# Patient Record
Sex: MALE | Race: BLACK OR AFRICAN AMERICAN | HISPANIC OR LATINO | Employment: FULL TIME | URBAN - METROPOLITAN AREA
[De-identification: names, ages, dates, MRNs, and addresses within clinical notes are randomized per-mention and may not be internally consistent; named-entity substitution may affect disease eponyms.]

---

## 2020-12-24 ENCOUNTER — HOSPITAL ENCOUNTER (EMERGENCY)
Facility: HOSPITAL | Age: 30
Discharge: HOME | End: 2020-12-24
Attending: EMERGENCY MEDICINE | Admitting: EMERGENCY MEDICINE

## 2020-12-24 VITALS
OXYGEN SATURATION: 98 % | WEIGHT: 270 LBS | HEART RATE: 102 BPM | SYSTOLIC BLOOD PRESSURE: 132 MMHG | TEMPERATURE: 98.1 F | RESPIRATION RATE: 18 BRPM | HEIGHT: 68 IN | BODY MASS INDEX: 40.92 KG/M2 | DIASTOLIC BLOOD PRESSURE: 72 MMHG

## 2020-12-24 DIAGNOSIS — M79.605 LEFT LEG PAIN: Primary | ICD-10-CM

## 2020-12-24 DIAGNOSIS — S76.119A: ICD-10-CM

## 2020-12-24 PROCEDURE — 99283 EMERGENCY DEPT VISIT LOW MDM: CPT

## 2020-12-24 PROCEDURE — 99284 EMERGENCY DEPT VISIT MOD MDM: CPT | Performed by: EMERGENCY MEDICINE

## 2020-12-24 RX ORDER — IBUPROFEN 600 MG/1
600 TABLET ORAL ONCE
Status: COMPLETED | OUTPATIENT
Start: 2020-12-24 | End: 2020-12-24

## 2020-12-24 RX ORDER — IBUPROFEN 600 MG/1
600 TABLET ORAL EVERY 6 HOURS PRN
Qty: 30 TABLET | Refills: 0 | Status: SHIPPED | OUTPATIENT
Start: 2020-12-24

## 2020-12-24 RX ADMIN — IBUPROFEN 600 MG: 600 TABLET, FILM COATED ORAL at 03:45

## 2021-01-12 ENCOUNTER — NURSE TRIAGE (OUTPATIENT)
Dept: OTHER | Facility: OTHER | Age: 31
End: 2021-01-12

## 2021-01-12 NOTE — TELEPHONE ENCOUNTER
Regarding: COVID Test Request - loss of taste and smell  ----- Message from Lily Million sent at 1/12/2021  8:21 AM EST -----  "I can't smell  I can't taste " Pt reported that this started yesterday  "I picked up my son from the  on Thursday and she was coughing   On Sunday the  notified me that she couldn't watch my son, because she's under quarentine "

## 2021-01-12 NOTE — TELEPHONE ENCOUNTER
Called and spoke to patient  Patient stated that he called his health insurance and that he was told that they were not covering the cost of the test  Patient stated that he would be covered if he went to an Urgent Care and already spoke with someone at [facility] and that he was on his way there now  Patient does not have a PCP and is not interested at this time in establishing care

## 2022-12-14 ENCOUNTER — HOSPITAL ENCOUNTER (EMERGENCY)
Facility: HOSPITAL | Age: 32
Discharge: HOME | End: 2022-12-14
Attending: EMERGENCY MEDICINE

## 2022-12-14 ENCOUNTER — HOSPITAL ENCOUNTER (EMERGENCY)
Facility: HOSPITAL | Age: 32
End: 2022-12-14
Attending: EMERGENCY MEDICINE

## 2022-12-14 ENCOUNTER — APPOINTMENT (EMERGENCY)
Dept: CT IMAGING | Facility: HOSPITAL | Age: 32
End: 2022-12-14

## 2022-12-14 VITALS
OXYGEN SATURATION: 99 % | RESPIRATION RATE: 18 BRPM | DIASTOLIC BLOOD PRESSURE: 105 MMHG | SYSTOLIC BLOOD PRESSURE: 177 MMHG | TEMPERATURE: 98 F | HEART RATE: 95 BPM

## 2022-12-14 VITALS
RESPIRATION RATE: 18 BRPM | DIASTOLIC BLOOD PRESSURE: 110 MMHG | SYSTOLIC BLOOD PRESSURE: 174 MMHG | OXYGEN SATURATION: 99 % | HEART RATE: 89 BPM | TEMPERATURE: 97.3 F

## 2022-12-14 DIAGNOSIS — J36 PERITONSILLAR ABSCESS: Primary | ICD-10-CM

## 2022-12-14 DIAGNOSIS — J36 PERITONSILLITIS: Primary | ICD-10-CM

## 2022-12-14 LAB
ALBUMIN SERPL BCP-MCNC: 4.5 G/DL (ref 3.5–5)
ALP SERPL-CCNC: 83 U/L (ref 34–104)
ALT SERPL W P-5'-P-CCNC: 19 U/L (ref 7–52)
ANION GAP SERPL CALCULATED.3IONS-SCNC: 12 MMOL/L (ref 4–13)
AST SERPL W P-5'-P-CCNC: 15 U/L (ref 13–39)
BASOPHILS # BLD AUTO: 0.04 THOUSANDS/ÂΜL (ref 0–0.1)
BASOPHILS NFR BLD AUTO: 0 % (ref 0–1)
BILIRUB SERPL-MCNC: 1.18 MG/DL (ref 0.2–1)
BUN SERPL-MCNC: 10 MG/DL (ref 5–25)
CALCIUM SERPL-MCNC: 9.8 MG/DL (ref 8.4–10.2)
CHLORIDE SERPL-SCNC: 100 MMOL/L (ref 96–108)
CO2 SERPL-SCNC: 26 MMOL/L (ref 21–32)
CREAT SERPL-MCNC: 0.85 MG/DL (ref 0.6–1.3)
EOSINOPHIL # BLD AUTO: 0.18 THOUSAND/ÂΜL (ref 0–0.61)
EOSINOPHIL NFR BLD AUTO: 1 % (ref 0–6)
ERYTHROCYTE [DISTWIDTH] IN BLOOD BY AUTOMATED COUNT: 12.4 % (ref 11.6–15.1)
GFR SERPL CREATININE-BSD FRML MDRD: 114 ML/MIN/1.73SQ M
GLUCOSE SERPL-MCNC: 104 MG/DL (ref 65–140)
HCT VFR BLD AUTO: 42.6 % (ref 36.5–49.3)
HETEROPH AB SER QL: NEGATIVE
HGB BLD-MCNC: 14.6 G/DL (ref 12–17)
IMM GRANULOCYTES # BLD AUTO: 0.04 THOUSAND/UL (ref 0–0.2)
IMM GRANULOCYTES NFR BLD AUTO: 0 % (ref 0–2)
LYMPHOCYTES # BLD AUTO: 2.55 THOUSANDS/ÂΜL (ref 0.6–4.47)
LYMPHOCYTES NFR BLD AUTO: 19 % (ref 14–44)
MCH RBC QN AUTO: 28.2 PG (ref 26.8–34.3)
MCHC RBC AUTO-ENTMCNC: 34.3 G/DL (ref 31.4–37.4)
MCV RBC AUTO: 82 FL (ref 82–98)
MONOCYTES # BLD AUTO: 1.39 THOUSAND/ÂΜL (ref 0.17–1.22)
MONOCYTES NFR BLD AUTO: 10 % (ref 4–12)
NEUTROPHILS # BLD AUTO: 9.29 THOUSANDS/ÂΜL (ref 1.85–7.62)
NEUTS SEG NFR BLD AUTO: 70 % (ref 43–75)
NRBC BLD AUTO-RTO: 0 /100 WBCS
PLATELET # BLD AUTO: 285 THOUSANDS/UL (ref 149–390)
PMV BLD AUTO: 8.9 FL (ref 8.9–12.7)
POTASSIUM SERPL-SCNC: 3.4 MMOL/L (ref 3.5–5.3)
PROT SERPL-MCNC: 8.6 G/DL (ref 6.4–8.4)
RBC # BLD AUTO: 5.18 MILLION/UL (ref 3.88–5.62)
S PYO DNA THROAT QL NAA+PROBE: DETECTED
SODIUM SERPL-SCNC: 138 MMOL/L (ref 135–147)
WBC # BLD AUTO: 13.49 THOUSAND/UL (ref 4.31–10.16)

## 2022-12-14 RX ORDER — LIDOCAINE HYDROCHLORIDE AND EPINEPHRINE BITARTRATE 20; .01 MG/ML; MG/ML
1 INJECTION, SOLUTION SUBCUTANEOUS ONCE
Status: DISCONTINUED | OUTPATIENT
Start: 2022-12-14 | End: 2022-12-14 | Stop reason: HOSPADM

## 2022-12-14 RX ORDER — CLINDAMYCIN HYDROCHLORIDE 300 MG/1
300 CAPSULE ORAL 4 TIMES DAILY
Qty: 40 CAPSULE | Refills: 0 | Status: SHIPPED | OUTPATIENT
Start: 2022-12-14 | End: 2022-12-24

## 2022-12-14 RX ORDER — DEXAMETHASONE SODIUM PHOSPHATE 10 MG/ML
10 INJECTION, SOLUTION INTRAMUSCULAR; INTRAVENOUS ONCE
Status: COMPLETED | OUTPATIENT
Start: 2022-12-14 | End: 2022-12-14

## 2022-12-14 RX ORDER — LIDOCAINE HYDROCHLORIDE AND EPINEPHRINE 20; 5 MG/ML; UG/ML
1 INJECTION, SOLUTION EPIDURAL; INFILTRATION; INTRACAUDAL; PERINEURAL ONCE
Status: DISCONTINUED | OUTPATIENT
Start: 2022-12-14 | End: 2022-12-14 | Stop reason: HOSPADM

## 2022-12-14 RX ADMIN — IOHEXOL 85 ML: 350 INJECTION, SOLUTION INTRAVENOUS at 11:19

## 2022-12-14 RX ADMIN — TOPICAL ANESTHETIC 2 SPRAY: 200 SPRAY DENTAL; PERIODONTAL at 17:30

## 2022-12-14 RX ADMIN — DEXAMETHASONE SODIUM PHOSPHATE 10 MG: 10 INJECTION, SOLUTION INTRAMUSCULAR; INTRAVENOUS at 13:07

## 2022-12-14 NOTE — CONSULTS
OTOLARYNGOLOGY CONSULT    Date of Service: 12/14/2022    Reason for consult: Peritonsillar Abscess     ASSESSMENT/PLAN:  Anthony Ovalles is a 34 y o  male who we admitted directly to the ED for drainage of PTA  No fluid could be aspirated  D/c home with clindamycin q6 hours for 10 days  Follow up with Dr Floyd Finn in 1 week outpatient  HPI  This patient is a 35 yo male who presented with multiple days of throat discomfort and swelling  The patient was found to have L PTA on CT 2 1cm  The patient was transferred to [facility] for aspiration, possible I&D and medical treatment  Denies nausea, foul taste in mouth, and prior tonsil infections       [facility] MEDICATIONS  Current Facility-Administered Medications   Medication Dose Route Frequency Provider Last Rate Last Admin   • benzocaine (HURRICAINE) 20 % mucosal spray 2 spray  2 spray Mucosal Once Mauricio Lepe MD       • lidocaine-epinephrine (XYLOCAINE-MPF/EPINEPHRINE) 2 %-1:200,000 injection 1 mL  1 mL Infiltration Once Misa Evans MD       • lidocaine-epinephrine (XYLOCAINE/EPINEPHRINE) 2 %-1:100,000 injection 1 mL  1 mL Infiltration Once Misa Evans MD         Current Outpatient Medications   Medication Sig Dispense Refill   • clindamycin (CLEOCIN) 300 MG capsule Take 1 capsule (300 mg total) by mouth 4 (four) times a day for 10 days 40 capsule 0   • ibuprofen (MOTRIN) 600 mg tablet Take 1 tablet (600 mg total) by mouth every 6 (six) hours as needed for mild pain 30 tablet 0       REVIEW OF SYSTEMS  As above    HISTORIES  PMH:  Past Medical History:   Diagnosis Date   • Patient denies medical problems        PSH:  Past Surgical History:   Procedure Laterality Date   • NO PAST SURGERIES     • NOSE SURGERY         SocHx:  Social History     Tobacco Use   • Smoking status: Some Days     Types: Cigarettes   • Smokeless tobacco: Never   • Tobacco comments:     per Naz   Vaping Use   • Vaping Use: Never used   Substance Use Topics   • Alcohol use: Yes     Comment: occasional   • Drug use: Yes     Types: Marijuana, Other, Amphetamines       FH:  Family History   Problem Relation Age of Onset   • Diabetes Maternal Aunt        ALLERGIES:  No Known Allergies    PHYSICAL EXAM  Visit Vitals  BP (!) 177/105 (BP Location: Left arm)   Pulse 95   Temp 98 °F (36 7 °C)   Resp 18   SpO2 99%   Smoking Status Some Days       General: NAD, AOx4  Eyes:  EOMI, PERRL  Ears:External ears normal in appearance   Nose:  External appearance normal, no septal deviation   Oral cavity:  L PTA visualized with uvula deviated to the R, mild trismus 2/2 pain  Neck: Trachea is midline; no thyroid nodules, Salivary glands symmetrical, no masses/abnormality on palpation  Lymph:  Mild LAD  Skin:  No obvious facial lesions  Neuro: Motor and sensory grossly intact  Face symmetrical, no obvious cranial nerve palsies,motor and sensory grossly intact, no focal deficits  Lungs:  Normal work of breathing, symmetrical chest expansion  Vascular: Well perfused      LABORATORY  Reviewed    PROCEDURES   PTA Aspiration:     The patient was placed in the sitting position  1 hurricane spray was applied to the kenny-tonsillar area  The area was injected with 2cc of Xilocaine using a 22 gauge needle submucosally  The area between uvula and anterior pillar was aspirated without relief in 2 places  The patient tolerated the procedure well without complications       RADIOLOGY      Patient Active Problem List    Diagnosis Date Noted   • Peritonsillitis 12/14/2022         Mynor Mathews MD  Otolaryngology--Head and Neck Surgery  [facility]  12/14/2022 6:30 PM

## 2022-12-14 NOTE — Clinical Note
Myrtle PutnamLilliana was seen and treated in our emergency department on 12/14/2022  Diagnosis:     Johnny    He may return on this date: 12/15/2022         If you have any questions or concerns, please don't hesitate to call        Candace Christie, DO    ______________________________           _______________          _______________  Hospital Representative                              Date                                Time

## 2022-12-14 NOTE — EMTALA/ACUTE CARE TRANSFER
[facility] EMERGENCY DEPARTMENT  [address] [facility] [address]  Dept: [phone]      EMTALA TRANSFER CONSENT    NAME Nemesio STEVE 1990                              MRN [id number]    I have been informed of my rights regarding examination, treatment, and transfer   by Dr Ilir Chávez: Specialized equipment and/or services available at the receiving facility (Include comment)________________________    Risks: Potential for delay in receiving treatment, Potential deterioration of medical condition      Consent for Transfer:  I acknowledge that my medical condition has been evaluated and explained to me by the emergency department physician or other qualified medical person and/or my attending physician, who has recommended that I be transferred to the service of  Accepting Physician: Dr Marimar Duke at    The above potential benefits of such transfer, the potential risks associated with such transfer, and the probable risks of not being transferred have been explained to me, and I fully understand them  The doctor has explained that, in my case, the benefits of transfer outweigh the risks  I agree to be transferred  I authorize the performance of emergency medical procedures and treatments upon me in both transit and upon arrival at the receiving facility  Additionally, I authorize the release of any and all medical records to the receiving facility and request they be transported with me, if possible  I understand that the safest mode of transportation during a medical emergency is an ambulance and that the Hospital advocates the use of this mode of transport  Risks of traveling to the receiving facility by car, including absence of medical control, life sustaining equipment, such as oxygen, and medical personnel has been explained to me and I fully understand them      (PURA CORRECT BOX BELOW)  [  ]  I consent to the stated transfer and to be transported by ambulance/helicopter  [  ]  I consent to the stated transfer, but refuse transportation by ambulance and accept full responsibility for my transportation by car  I understand the risks of non-ambulance transfers and I exonerate the Hospital and its staff from any deterioration in my condition that results from this refusal     X___________________________________________    DATE  22  TIME________  Signature of patient or legally responsible individual signing on patient behalf           RELATIONSHIP TO PATIENT_________________________          Provider Certification    NAME Lizbeth Reed                                         1990                              MRN [id number]    A medical screening exam was performed on the above named patient  Based on the examination:    Condition Necessitating Transfer The encounter diagnosis was Peritonsillar abscess  Patient Condition: The patient has been stabilized such that within reasonable medical probability, no material deterioration of the patient condition or the condition of the unborn child(jayne) is likely to result from the transfer    Reason for Transfer: Level of Care needed not available at this facility    Transfer Requirements: Facility     · Space available and qualified personnel available for treatment as acknowledged by    · Agreed to accept transfer and to provide appropriate medical treatment as acknowledged by       Dr Ronny Garcia  · Appropriate medical records of the examination and treatment of the patient are provided at the time of transfer   [address], [address] _______  · Transfer will be performed by qualified personnel from    and appropriate transfer equipment as required, including the use of necessary and appropriate life support measures      Provider Certification: I have examined the patient and explained the following risks and benefits of being transferred/refusing transfer to the patient/family:  General risk, such as traffic hazards, adverse weather conditions, rough terrain or turbulence, possible failure of equipment (including vehicle or aircraft), or consequences of actions of persons outside the control of the transport personnel, Unanticipated needs of medical equipment and personnel during transport      Based on these reasonable risks and benefits to the patient and/or the unborn child(jayne), and based upon the information available at the time of the patient’s examination, I certify that the medical benefits reasonably to be expected from the provision of appropriate medical treatments at another medical facility outweigh the increasing risks, if any, to the individual’s medical condition, and in the case of labor to the unborn child, from effecting the transfer      X____________________________________________ DATE 12/14/22        TIME_______      ORIGINAL - SEND TO MEDICAL RECORDS   COPY - SEND WITH PATIENT DURING TRANSFER

## 2022-12-14 NOTE — ED PROVIDER NOTES
History  Chief Complaint   Patient presents with   • Sore Throat     Onset of sore throat for 3 days     19-year-old male was sent in from an urgent care with 3 days of significant throat pain that is worse when he swallows, he has had decreased appetite due to pain with swallowing, reports he feels some swelling in the back of his throat, the patient denies any fever, headache, dizziness, chest pain, cough, shortness of breath, nausea, vomiting, abdominal pain, no GI or urinary complaints  The patient had a strep swab at the urgent care which he says he feels was poorly done and was strep negative  The patient denies any recent trauma, reports he was previously diagnosed with hypertension but never took any medications, no allergies, no surgeries, the patient drinks almost every day and drinks a lot of alcohol, smokes marijuana daily with tobacco rolled into his blunts, denies any other drugs  Prior to Admission Medications   Prescriptions Last Dose Informant Patient Reported? Taking?   ibuprofen (MOTRIN) 600 mg tablet   No No   Sig: Take 1 tablet (600 mg total) by mouth every 6 (six) hours as needed for mild pain      Facility-Administered Medications: None       Past Medical History:   Diagnosis Date   • Patient denies medical problems        Past Surgical History:   Procedure Laterality Date   • NO PAST SURGERIES     • NOSE SURGERY         Family History   Problem Relation Age of Onset   • Diabetes Maternal Aunt      I have reviewed and agree with the history as documented      E-Cigarette/Vaping   • E-Cigarette Use Never User      E-Cigarette/Vaping Substances     Social History     Tobacco Use   • Smoking status: Some Days     Types: Cigarettes   • Smokeless tobacco: Never   • Tobacco comments:     per [place]   Vaping Use   • Vaping Use: Never used   Substance Use Topics   • Alcohol use: Yes     Comment: occasional   • Drug use: Yes     Types: Marijuana, Other, Amphetamines       Review of Systems Constitutional: Negative for fever  HENT: Positive for trouble swallowing  Negative for congestion  Eyes: Negative for visual disturbance  Respiratory: Negative for cough and shortness of breath  Cardiovascular: Negative for chest pain  Gastrointestinal: Negative for abdominal pain, diarrhea and vomiting  Endocrine: Negative for polyuria  Genitourinary: Negative for dysuria and hematuria  Musculoskeletal: Negative for myalgias  Neurological: Negative for dizziness and headaches  Physical Exam  Physical Exam  Vitals and nursing note reviewed  Constitutional:       General: He is not in acute distress  Appearance: Normal appearance  HENT:      Head: Normocephalic and atraumatic  Right Ear: External ear normal       Left Ear: External ear normal       Mouth/Throat:      Mouth: Mucous membranes are moist       Tonsils: Tonsillar exudate present  Comments: Significant bilateral tonsillar swelling  Eyes:      Conjunctiva/sclera: Conjunctivae normal       Pupils: Pupils are equal, round, and reactive to light  Cardiovascular:      Rate and Rhythm: Normal rate and regular rhythm  Heart sounds: Normal heart sounds  Pulmonary:      Effort: Pulmonary effort is normal  No respiratory distress  Breath sounds: Normal breath sounds  Abdominal:      General: Abdomen is flat  There is no distension  Palpations: Abdomen is soft  Tenderness: There is no abdominal tenderness  Musculoskeletal:         General: No deformity  Normal range of motion  Cervical back: Normal range of motion  Lymphadenopathy:      Cervical: Cervical adenopathy present  Skin:     General: Skin is warm and dry  Neurological:      General: No focal deficit present  Mental Status: He is alert and oriented to person, place, and time     Psychiatric:         Mood and Affect: Mood normal          Behavior: Behavior normal          Vital Signs  ED Triage Vitals   Temperature Pulse Respirations Blood Pressure SpO2   12/14/22 1005 12/14/22 1005 12/14/22 1005 12/14/22 1006 12/14/22 1006   (!) 97 3 °F (36 3 °C) 100 20 (!) 163/127 100 %      Temp Source Heart Rate Source Patient Position - Orthostatic VS BP Location FiO2 (%)   12/14/22 1005 12/14/22 1005 12/14/22 1006 12/14/22 1006 --   Oral Monitor Sitting Left arm       Pain Score       12/14/22 1006       8           Vitals:    12/14/22 1005 12/14/22 1006 12/14/22 1131   BP:  (!) 163/127 (!) 174/110   Pulse: 100 100 89   Patient Position - Orthostatic VS:  Sitting          Visual Acuity      ED Medications  Medications   iohexol (OMNIPAQUE) 350 MG/ML injection (SINGLE-DOSE) 100 mL (85 mL Intravenous Given 12/14/22 1119)   dexamethasone (PF) (DECADRON) injection 10 mg (10 mg Intravenous Given 12/14/22 1307)       Diagnostic Studies  Results Reviewed     Procedure Component Value Units Date/Time    Mononucleosis screen [[id number]]  (Normal) Collected: 12/14/22 1027    Lab Status: Final result Specimen: Blood from Arm, Left Updated: 12/14/22 1445     Monotest Negative    Strep A PCR [[id number]]  (Abnormal) Collected: 12/14/22 1027    Lab Status: Final result Specimen: Throat Updated: 12/14/22 1114     STREP A PCR Detected    Comprehensive metabolic panel [[id number]]  (Abnormal) Collected: 12/14/22 1027    Lab Status: Final result Specimen: Blood from Arm, Left Updated: 12/14/22 1059     Sodium 138 mmol/L      Potassium 3 4 mmol/L      Chloride 100 mmol/L      CO2 26 mmol/L      ANION GAP 12 mmol/L      BUN 10 mg/dL      Creatinine 0 85 mg/dL      Glucose 104 mg/dL      Calcium 9 8 mg/dL      AST 15 U/L      ALT 19 U/L      Alkaline Phosphatase 83 U/L      Total Protein 8 6 g/dL      Albumin 4 5 g/dL      Total Bilirubin 1 18 mg/dL      eGFR 114 ml/min/1 73sq m     Narrative:      [place] guidelines for Chronic Kidney Disease (CKD):   •  Stage 1 with normal or high GFR (GFR > 90 mL/min/1 73 square meters)  •  Stage 2 Mild CKD (GFR = 60-89 mL/min/1 73 square meters)  •  Stage 3A Moderate CKD (GFR = 45-59 mL/min/1 73 square meters)  •  Stage 3B Moderate CKD (GFR = 30-44 mL/min/1 73 square meters)  •  Stage 4 Severe CKD (GFR = 15-29 mL/min/1 73 square meters)  •  Stage 5 End Stage CKD (GFR <15 mL/min/1 73 square meters)  Note: GFR calculation is accurate only with a steady state creatinine    CBC and differential [[id number]]  (Abnormal) Collected: 12/14/22 1027    Lab Status: Final result Specimen: Blood from Arm, Left Updated: 12/14/22 1036     WBC 13 49 Thousand/uL      RBC 5 18 Million/uL      Hemoglobin 14 6 g/dL      Hematocrit 42 6 %      MCV 82 fL      MCH 28 2 pg      MCHC 34 3 g/dL      RDW 12 4 %      MPV 8 9 fL      Platelets 126 Thousands/uL      nRBC 0 /100 WBCs      Neutrophils Relative 70 %      Immat GRANS % 0 %      Lymphocytes Relative 19 %      Monocytes Relative 10 %      Eosinophils Relative 1 %      Basophils Relative 0 %      Neutrophils Absolute 9 29 Thousands/µL      Immature Grans Absolute 0 04 Thousand/uL      Lymphocytes Absolute 2 55 Thousands/µL      Monocytes Absolute 1 39 Thousand/µL      Eosinophils Absolute 0 18 Thousand/µL      Basophils Absolute 0 04 Thousands/µL                  CT soft tissue neck with contrast   Final Result by Priyanka Yanes MD (12/14 1136)      2 1 cm left peritonsillar abscess  Recommend ENT consultation  Mild reactive left cervical lymphadenopathy  Recommend clinical follow-up to resolution  Additional chronic/incidental findings as detailed above  The study was marked in [facility] for immediate notification  Workstation performed: [id number]                    Procedures  Procedures         ED Course         MDM  Number of Diagnoses or Management Options  Peritonsillar abscess  Diagnosis management comments: The patient was discussed with ENT, they report the patient should be transferred to [facility] for I&D of the peritonsillar abscess    The patient is comfortable with transfer, and after discussion with the transfer center and the excepting physician, the patient will be allowed to transfer via POV, as he is stable at this time  The patient has been accepted by Dr Zarina Melendez, paperwork has been given, patient will be transferred for further management  Disposition  Final diagnoses:   Peritonsillar abscess     Time reflects when diagnosis was documented in both MDM as applicable and the Disposition within this note     Time User Action Codes Description Comment    12/14/2022  1:01 PM Arsen, [facility] Peritonsillar abscess       ED Disposition     ED Disposition   Transfer to Another Facility-In Network    Condition   --    Date/Time   Wed Dec 14, 2022  1:00 PM    Comment   Alice Gaytan should be transferred out to [facility], accepted by Dr Zarina Melendez MD Documentation    Hima Gamez Most Recent Value   Patient Condition The patient has been stabilized such that within reasonable medical probability, no material deterioration of the patient condition or the condition of the unborn child(jayne) is likely to result from the transfer   Reason for Transfer Level of Care needed not available at this facility   Benefits of Transfer Specialized equipment and/or services available at the receiving facility (Include comment)________________________   Risks of Transfer Potential for delay in receiving treatment, Potential deterioration of medical condition   Accepting Physician Dr Zarina Coronado MD   Provider Certification General risk, such as traffic hazards, adverse weather conditions, rough terrain or turbulence, possible failure of equipment (including vehicle or aircraft), or consequences of actions of persons outside the control of the transport personnel, Unanticipated needs of medical equipment and personnel during transport      RN Documentation    Flowsheet Row Most Recent Value   Transport Mode Car   Level of Care Other (Comment)      Follow-up Information     Follow up With Specialties Details Why Contact Info Additional [id number]  Emergency Department Emergency Medicine Go to  As needed [address] [address]  [address] Emergency Department, [address], [address]    Extension [facility] Schedule an appointment as soon as possible for a visit in 3 days For follow-up [address]  [address] [facility] [id number] [phone] YV DNOPQ ODXPPR PCFQDLCI TJIQRR, [address], [place], [place], [id number] [phone]          Discharge Medication List as of 12/14/2022  1:16 PM      CONTINUE these medications which have NOT CHANGED    Details   ibuprofen (MOTRIN) 600 mg tablet Take 1 tablet (600 mg total) by mouth every 6 (six) hours as needed for mild pain, Starting Thu 12/24/2020, Normal             No discharge procedures on file      PDMP Review     None          ED Provider  Electronically Signed by           Behtany Mayers MD  12/15/22 2310